# Patient Record
Sex: MALE | Race: BLACK OR AFRICAN AMERICAN | URBAN - METROPOLITAN AREA
[De-identification: names, ages, dates, MRNs, and addresses within clinical notes are randomized per-mention and may not be internally consistent; named-entity substitution may affect disease eponyms.]

---

## 2017-12-15 ENCOUNTER — ED HISTORICAL/CONVERTED ENCOUNTER (OUTPATIENT)
Dept: OTHER | Age: 37
End: 2017-12-15

## 2020-01-01 ENCOUNTER — HOSPITAL ENCOUNTER (EMERGENCY)
Age: 40
End: 2020-11-22
Attending: EMERGENCY MEDICINE

## 2020-01-01 ENCOUNTER — ANESTHESIA (OUTPATIENT)
Dept: EMERGENCY DEPT | Age: 40
End: 2020-01-01

## 2020-01-01 ENCOUNTER — ANESTHESIA EVENT (OUTPATIENT)
Dept: EMERGENCY DEPT | Age: 40
End: 2020-01-01

## 2020-01-01 VITALS — TEMPERATURE: 32 F

## 2020-01-01 DIAGNOSIS — S21.131A: ICD-10-CM

## 2020-01-01 DIAGNOSIS — I46.8 CARDIAC ARREST DUE TO TRAUMA (HCC): ICD-10-CM
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
## 2020-01-01 PROCEDURE — 36430 TRANSFUSION BLD/BLD COMPNT: CPT

## 2020-01-01 PROCEDURE — 86900 BLOOD TYPING SEROLOGIC ABO: CPT

## 2020-01-01 PROCEDURE — 75810000165 HC THORACENTESIS

## 2020-01-01 PROCEDURE — 99285 EMERGENCY DEPT VISIT HI MDM: CPT

## 2020-01-01 PROCEDURE — 75810000466 HC TRAUMA RESPONSE LVL 3

## 2020-01-01 PROCEDURE — P9016 RBC LEUKOCYTES REDUCED: HCPCS
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
## 2020-11-22 NOTE — ANESTHESIA PROCEDURE NOTES
Emergent Intubation Performed by: Remedios Matias CRNA Authorized by: Remedios Matias CRNA Emergent Intubation: Location:  ED 
Date/Time:  11/22/2020 1:49 AM 
Indications:  Airway compromise Spontaneous Ventilation: absent Level of Consciousness: unresponsive Preoxygenated: Yes Airway Documentation: Airway:  ETT - Cuffed Technique:  Intubating stylet Insertion Site:  Oral 
Blade Type:  Sandy Blade Size:  3 ETT Line Yaya:  Teeth ETT Insertion depth (cm):  22 Placement verified by: auscultation and EtCO2 Attempts:  1 Difficult airway: Yes Copious amounts of blood noted to patient throat. Intubation successful.

## 2020-11-23 LAB
ABO + RH BLD: NORMAL
BLD PROD TYP BPU: NORMAL
BLD PROD TYP BPU: NORMAL
BLOOD GROUP ANTIBODIES SERPL: NORMAL
BPU ID: NORMAL
BPU ID: NORMAL
CROSSMATCH RESULT,%XM: NORMAL
CROSSMATCH RESULT,%XM: NORMAL
SPECIMEN EXP DATE BLD: NORMAL
STATUS OF UNIT,%ST: NORMAL
STATUS OF UNIT,%ST: NORMAL
TRANSFUSION STATUS PATIENT QL: NORMAL
TRANSFUSION STATUS PATIENT QL: NORMAL
UNIT DIVISION, %UDIV: 0
UNIT DIVISION, %UDIV: 0
UNIT TAG COMMENT,%CM: NORMAL
UNIT TAG COMMENT,%CM: NORMAL